# Patient Record
Sex: FEMALE | Race: OTHER | Employment: UNEMPLOYED | URBAN - METROPOLITAN AREA
[De-identification: names, ages, dates, MRNs, and addresses within clinical notes are randomized per-mention and may not be internally consistent; named-entity substitution may affect disease eponyms.]

---

## 2021-02-05 ENCOUNTER — HOSPITAL ENCOUNTER (EMERGENCY)
Age: 31
Discharge: HOME | End: 2021-02-05
Attending: EMERGENCY MEDICINE
Payer: MEDICAID

## 2021-02-05 ENCOUNTER — APPOINTMENT (OUTPATIENT)
Dept: GENERAL RADIOLOGY | Age: 31
End: 2021-02-05
Attending: PHYSICIAN ASSISTANT
Payer: MEDICAID

## 2021-02-05 VITALS
HEART RATE: 122 BPM | OXYGEN SATURATION: 96 % | SYSTOLIC BLOOD PRESSURE: 130 MMHG | TEMPERATURE: 99.4 F | DIASTOLIC BLOOD PRESSURE: 78 MMHG | RESPIRATION RATE: 18 BRPM

## 2021-02-05 DIAGNOSIS — S90.01XA CONTUSION OF RIGHT ANKLE, INITIAL ENCOUNTER: Primary | ICD-10-CM

## 2021-02-05 PROCEDURE — 99283 EMERGENCY DEPT VISIT LOW MDM: CPT

## 2021-02-05 PROCEDURE — 73610 X-RAY EXAM OF ANKLE: CPT

## 2021-02-05 RX ORDER — NAPROXEN 500 MG/1
500 TABLET ORAL
Qty: 20 TAB | Refills: 0 | Status: SHIPPED | OUTPATIENT
Start: 2021-02-05

## 2021-02-06 NOTE — ED PROVIDER NOTES
35year old female no medical hx presenting to the ED for R ankle pain. Pt notes that she was at [employer] when one of the employees was lifting a crate off of a dolley, thinks it may have slipped out of his hands, landed directly onto her RIGHT ankle. Incident occurred about 4 days ago. Patient notes moderately severe pain to the right lateral ankle, worse with palpation and weightbearing, notes that it was mild at first, more severe now, sharp. No relief with OTC medications, notes that her mother gave her a pain pill which did help. PMHx: Hep C  PSx: , surgery on R leg for dog bite  Social: + Smoker. No alcohol or drug use. 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 Foot Injury          History reviewed. No pertinent past medical history. Past Surgical History:   Procedure Laterality Date    HX  SECTION           No family history on file.     Social History     Socioeconomic History    Marital status: SINGLE     Spouse name: Not on file    Number of children: Not on file    Years of education: Not on file    Highest education level: Not on file   Occupational History    Not on file   Social Needs    Financial resource strain: Not on file    Food insecurity     Worry: Not on file     Inability: Not on file    Transportation needs     Medical: Not on file     Non-medical: Not on file   Tobacco Use    Smoking status: Current Every Day Smoker   Substance and Sexual Activity    Alcohol use: Not on file    Drug use: Not on file    Sexual activity: Not on file   Lifestyle    Physical activity     Days per week: Not on file     Minutes per session: Not on file    Stress: Not on file   Relationships    Social connections     Talks on phone: Not on file     Gets together: Not on file     Attends Worship service: Not on file     Active member of club or organization: Not on file     Attends meetings of clubs or organizations: Not on file     Relationship status: Not on file    Intimate partner violence     Fear of current or ex partner: Not on file     Emotionally abused: Not on file     Physically abused: Not on file     Forced sexual activity: Not on file   Other Topics Concern    Not on file   Social History Narrative    Not on file         ALLERGIES: Patient has no known allergies. Review of Systems   Constitutional: Negative for fever. HENT: Negative for facial swelling. Respiratory: Negative for shortness of breath. Cardiovascular: Negative for chest pain. Gastrointestinal: Negative for vomiting. Musculoskeletal: Positive for arthralgias. Skin: Negative for wound. Neurological: Negative for syncope. All other systems reviewed and are negative. Vitals:    02/05/21 1909   BP: 130/78   Pulse: (!) 122   Resp: 18   Temp: 99.4 °F (37.4 °C)   SpO2: 96%            Physical Exam  Vitals signs and nursing note reviewed. Constitutional:       General: She is not in acute distress. Appearance: She is well-developed. Comments: Pleasant, well-appearing white female   HENT:      Head: Normocephalic and atraumatic. Right Ear: External ear normal.      Left Ear: External ear normal.   Eyes:      General: No scleral icterus. Conjunctiva/sclera: Conjunctivae normal.   Neck:      Musculoskeletal: Neck supple. Trachea: No tracheal deviation. Cardiovascular:      Rate and Rhythm: Normal rate and regular rhythm. Heart sounds: Normal heart sounds. No murmur. No friction rub. No gallop. Pulmonary:      Effort: Pulmonary effort is normal. No respiratory distress. Breath sounds: Normal breath sounds. No stridor. No wheezing. Abdominal:      General: There is no distension. Palpations: Abdomen is soft. Musculoskeletal:      Comments: Right ankle: Exposed. Mild swelling laterally. Tenderness over the lateral malleolus. Foot. Normal distal sensation and cap refill.   Remainder of lower extremity unremarkable. Patient able to flex and extend the ankle, range of motion somewhat limited secondary to pain. Skin:     General: Skin is warm and dry. Neurological:      Mental Status: She is alert and oriented to person, place, and time. Psychiatric:         Behavior: Behavior normal.          MDM  Number of Diagnoses or Management Options  Diagnosis management comments: 50-year-old female presenting to the ED for right ankle pain after a [employer] employee dropped a crate on her ankle a few days ago. Mild swelling and tenderness on exam.  Will check x-ray. No fracture noted, discussed likely contusion, possible strain/sprain. Encouraged use of immobilizer, crutches, rice, NSAID, Ortho follow-up if not improving.        Amount and/or Complexity of Data Reviewed  Tests in the radiology section of CPT®: ordered and reviewed  Discuss the patient with other providers: yes (Dr. Pepper Lozano, ED attending)           Procedures

## 2021-02-06 NOTE — ED TRIAGE NOTES
She reports an injuryto her right ankle by a [employer] worker four days ago. She says the pain has gotten worse. She says her mother gave her some pain medication but that didn't help.

## 2021-02-06 NOTE — ED NOTES
Discharge instructions discussed with pt. Pt verbalized understanding. Pt ambulatory from department, gait steady with crutches.

## 2023-05-14 RX ORDER — NAPROXEN 500 MG/1
500 TABLET ORAL 2 TIMES DAILY PRN
COMMUNITY
Start: 2021-02-05